# Patient Record
Sex: FEMALE | Race: BLACK OR AFRICAN AMERICAN | NOT HISPANIC OR LATINO | Employment: OTHER | URBAN - METROPOLITAN AREA
[De-identification: names, ages, dates, MRNs, and addresses within clinical notes are randomized per-mention and may not be internally consistent; named-entity substitution may affect disease eponyms.]

---

## 2019-03-12 ENCOUNTER — OFFICE VISIT (OUTPATIENT)
Dept: CARDIOLOGY | Facility: CLINIC | Age: 79
End: 2019-03-12
Payer: MEDICARE

## 2019-03-12 VITALS
DIASTOLIC BLOOD PRESSURE: 88 MMHG | HEART RATE: 93 BPM | SYSTOLIC BLOOD PRESSURE: 150 MMHG | HEIGHT: 63 IN | WEIGHT: 216.38 LBS | BODY MASS INDEX: 38.34 KG/M2

## 2019-03-12 DIAGNOSIS — R07.9 ACUTE CHEST PAIN: ICD-10-CM

## 2019-03-12 DIAGNOSIS — E78.00 PURE HYPERCHOLESTEROLEMIA: ICD-10-CM

## 2019-03-12 DIAGNOSIS — I10 ESSENTIAL HYPERTENSION: Primary | ICD-10-CM

## 2019-03-12 DIAGNOSIS — R07.9 CHEST PAIN OF UNCERTAIN ETIOLOGY: ICD-10-CM

## 2019-03-12 DIAGNOSIS — J45.20 MILD INTERMITTENT ASTHMA WITHOUT COMPLICATION: ICD-10-CM

## 2019-03-12 DIAGNOSIS — R60.0 LOCALIZED EDEMA: ICD-10-CM

## 2019-03-12 DIAGNOSIS — K21.9 GASTROESOPHAGEAL REFLUX DISEASE, ESOPHAGITIS PRESENCE NOT SPECIFIED: ICD-10-CM

## 2019-03-12 PROCEDURE — 93000 EKG 12-LEAD: ICD-10-PCS | Mod: S$GLB,,, | Performed by: INTERNAL MEDICINE

## 2019-03-12 PROCEDURE — 1101F PT FALLS ASSESS-DOCD LE1/YR: CPT | Mod: CPTII,S$GLB,, | Performed by: INTERNAL MEDICINE

## 2019-03-12 PROCEDURE — 99999 PR PBB SHADOW E&M-NEW PATIENT-LVL III: CPT | Mod: PBBFAC,,, | Performed by: INTERNAL MEDICINE

## 2019-03-12 PROCEDURE — 1101F PR PT FALLS ASSESS DOC 0-1 FALLS W/OUT INJ PAST YR: ICD-10-PCS | Mod: CPTII,S$GLB,, | Performed by: INTERNAL MEDICINE

## 2019-03-12 PROCEDURE — 99999 PR PBB SHADOW E&M-NEW PATIENT-LVL III: ICD-10-PCS | Mod: PBBFAC,,, | Performed by: INTERNAL MEDICINE

## 2019-03-12 PROCEDURE — 3077F PR MOST RECENT SYSTOLIC BLOOD PRESSURE >= 140 MM HG: ICD-10-PCS | Mod: CPTII,S$GLB,, | Performed by: INTERNAL MEDICINE

## 2019-03-12 PROCEDURE — 3079F PR MOST RECENT DIASTOLIC BLOOD PRESSURE 80-89 MM HG: ICD-10-PCS | Mod: CPTII,S$GLB,, | Performed by: INTERNAL MEDICINE

## 2019-03-12 PROCEDURE — 93000 ELECTROCARDIOGRAM COMPLETE: CPT | Mod: S$GLB,,, | Performed by: INTERNAL MEDICINE

## 2019-03-12 PROCEDURE — 3079F DIAST BP 80-89 MM HG: CPT | Mod: CPTII,S$GLB,, | Performed by: INTERNAL MEDICINE

## 2019-03-12 PROCEDURE — 99205 PR OFFICE/OUTPT VISIT, NEW, LEVL V, 60-74 MIN: ICD-10-PCS | Mod: 25,S$GLB,, | Performed by: INTERNAL MEDICINE

## 2019-03-12 PROCEDURE — 3077F SYST BP >= 140 MM HG: CPT | Mod: CPTII,S$GLB,, | Performed by: INTERNAL MEDICINE

## 2019-03-12 PROCEDURE — 99205 OFFICE O/P NEW HI 60 MIN: CPT | Mod: 25,S$GLB,, | Performed by: INTERNAL MEDICINE

## 2019-03-12 RX ORDER — TIZANIDINE 4 MG/1
TABLET ORAL
Refills: 0 | COMMUNITY
Start: 2018-12-18

## 2019-03-12 RX ORDER — HYDRALAZINE HYDROCHLORIDE 10 MG/1
10 TABLET, FILM COATED ORAL 3 TIMES DAILY
Qty: 90 TABLET | Refills: 11 | Status: SHIPPED | OUTPATIENT
Start: 2019-03-12 | End: 2019-03-12 | Stop reason: SDUPTHER

## 2019-03-12 RX ORDER — AMLODIPINE BESYLATE 10 MG/1
TABLET ORAL
Qty: 90 TABLET | Refills: 3 | Status: SHIPPED | OUTPATIENT
Start: 2019-03-12 | End: 2019-03-12 | Stop reason: SDUPTHER

## 2019-03-12 RX ORDER — AMLODIPINE BESYLATE 10 MG/1
TABLET ORAL
COMMUNITY
End: 2019-03-12 | Stop reason: SDUPTHER

## 2019-03-12 RX ORDER — DULAGLUTIDE 1.5 MG/.5ML
INJECTION, SOLUTION SUBCUTANEOUS
COMMUNITY
Start: 2019-02-21

## 2019-03-12 RX ORDER — OMEPRAZOLE 20 MG/1
CAPSULE, DELAYED RELEASE ORAL
Refills: 0 | COMMUNITY
Start: 2019-02-25

## 2019-03-12 RX ORDER — POTASSIUM CHLORIDE 750 MG/1
TABLET, EXTENDED RELEASE ORAL
Refills: 0 | COMMUNITY
Start: 2019-02-19 | End: 2019-03-12

## 2019-03-12 RX ORDER — CHOLECALCIFEROL (VITAMIN D3) 125 MCG
CAPSULE ORAL
COMMUNITY
Start: 2018-09-05

## 2019-03-12 RX ORDER — LISINOPRIL 40 MG/1
TABLET ORAL
Refills: 0 | COMMUNITY
Start: 2018-12-07

## 2019-03-12 RX ORDER — ATORVASTATIN CALCIUM 40 MG/1
TABLET, FILM COATED ORAL
Refills: 0 | COMMUNITY
Start: 2019-02-26

## 2019-03-12 RX ORDER — HYDRALAZINE HYDROCHLORIDE 10 MG/1
10 TABLET, FILM COATED ORAL 3 TIMES DAILY
Qty: 270 TABLET | Refills: 3 | Status: SHIPPED | OUTPATIENT
Start: 2019-03-12 | End: 2020-03-11

## 2019-03-12 RX ORDER — AMLODIPINE BESYLATE 10 MG/1
TABLET ORAL
Qty: 90 TABLET | Refills: 3 | Status: SHIPPED | OUTPATIENT
Start: 2019-03-12

## 2019-03-12 RX ORDER — ONDANSETRON 4 MG/1
4 TABLET, FILM COATED ORAL EVERY 8 HOURS PRN
COMMUNITY

## 2019-03-12 RX ORDER — DIAZEPAM 5 MG/1
TABLET ORAL
Refills: 0 | COMMUNITY
Start: 2019-01-22

## 2019-03-12 RX ORDER — ALBUTEROL SULFATE 90 UG/1
AEROSOL, METERED RESPIRATORY (INHALATION)
COMMUNITY
Start: 2019-01-17

## 2019-03-12 NOTE — LETTER
March 12, 2019      Kassi Solis MD (Cindy)  [address] [facility] [address]           [facility]  [address] [place] [address], [address]  [facility] [address]  Phone: [phone]          Patient: Colleen Cabezas   MR Number: [id number]   YOB: 1940   Date of Visit: 3/12/2019       Dear Dr. Kassi Solis (Cindy):    Thank you for referring Colleen Cabezas to me for evaluation. Attached you will find relevant portions of my assessment and plan of care.    If you have questions, please do not hesitate to call me. I look forward to following Colleen Cabezas along with you.    Sincerely,    Ponce Sanders MD    Enclosure  CC:  No Recipients    If you would like to receive this communication electronically, please contact [email or web address] or [phone] to request more information on [employer] Link access.    For providers and/or their staff who would like to refer a patient to [facility], please contact us through our one-stop-shop provider referral line, [facility], at [phone].    If you feel you have received this communication in error or would no longer like to receive these types of communications, please e-mail [email or web address]

## 2019-03-12 NOTE — PROGRESS NOTES
"  Subjective:      Patient ID: Colleen Cabezas is a 78 y.o. female.    Chief Complaint: Chest Pain    HPI:  Pt used to see me years ago for hypertension.    Pt recently went to [facility] with chest pain.  Pt went to ER.  Pt had ECG's and labs and CXR In the ER.  Pt was felt to have indigestion and prescribed omeprazole.  There is no prior hx of chest pains before last week.  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  Pt has chronic shortness of breath with exertion.  Pt can walk about 2 blocks  There is no hx of chest pain with exertion.    HCTZ was recently discontinued possibly due to dehydration.    "I feel bloated when I don't take the HCTZ"    Dr Michelle did ultrasound of abdomen and bone density    Pt also saw Dr Woody    Review of Systems   Cardiovascular: Positive for chest pain and dyspnea on exertion. Negative for claudication, irregular heartbeat, leg swelling, near-syncope, orthopnea, palpitations and syncope.      Blood pressure at home 137/79 after taking AM meds.  Prior to meds /80's  Past Medical History:   Diagnosis Date    Asthma     Diabetes mellitus     Hyperlipidemia     Hypertension         Past Surgical History:   Procedure Laterality Date    EXPLORATORY LAPAROTOMY         Family History   Problem Relation Age of Onset    Heart failure Mother        Social History     Socioeconomic History    Marital status:      Spouse name: None    Number of children: None    Years of education: None    Highest education level: None   Social Needs    Financial resource strain: None    Food insecurity - worry: None    Food insecurity - inability: None    Transportation needs - medical: None    Transportation needs - non-medical: None   Occupational History    None   Tobacco Use    Smoking status: Never Smoker    Smokeless tobacco: Never Used   Substance and Sexual Activity " "   Alcohol use: No     Frequency: Never    Drug use: None    Sexual activity: None   Other Topics Concern    None   Social History Narrative    None       Current Outpatient Medications on File Prior to Visit   Medication Sig Dispense Refill    amLODIPine (NORVASC) 10 MG tablet amlodipine 10 mg tablet      atorvastatin (LIPITOR) 40 MG tablet TK 1 T PO QD  0    cholecalciferol, vitamin D3, (DIALYVITE VITAMIN D) 5,000 unit capsule vitamin d 5000 iu      diazePAM (VALIUM) 5 MG tablet TK 1 T PO QD PRN FOR ANXIETY  0    lisinopril (PRINIVIL,ZESTRIL) 40 MG tablet TK 1 T PO D  0    omeprazole (PRILOSEC) 20 MG capsule TK 1 T PO QD  0    ondansetron (ZOFRAN) 4 MG tablet Take 4 mg by mouth every 8 (eight) hours as needed for Nausea.      tiZANidine (ZANAFLEX) 4 MG tablet TK 1 T PO TID PRF MUSCLE SPASM  0    TRULICITY 1.5 mg/0.5 mL PnIj       VENTOLIN HFA 90 mcg/actuation inhaler       vitamin E 100 UNIT capsule Take by mouth.      [DISCONTINUED] potassium chloride (KLOR-CON) 10 MEQ TbSR TAKE 2 TABLETS BY MOUTH ON TUESDAY AND THURSDAY AND 1 TABLET BY MOUTH EVERY OTHER DAY  0    chlorpheniramine-pseudoephedrine-acetaminophen (SINUTAB) 2- mg per tablet Take 81 mg by mouth.       No current facility-administered medications on file prior to visit.        Review of patient's allergies indicates:   Allergen Reactions    Ibuprofen     Nitroglycerin      Objective:     Vitals:    03/12/19 1133 03/12/19 1201   BP: (!) 159/92 (!) 150/88   BP Location: Left arm Left arm   Patient Position: Sitting Sitting   BP Method: Large (Automatic)    Pulse: 93    Weight: 98.1 kg (216 lb 6.1 oz)    Height: 5' 3" (1.6 m)         Physical Exam   Constitutional: She is oriented to person, place, and time. She appears well-developed and well-nourished.   Eyes: No scleral icterus.   Neck: No JVD present. Carotid bruit is not present.   Cardiovascular: Normal rate and regular rhythm. Exam reveals no gallop.   No murmur " heard.  Pulmonary/Chest: Breath sounds normal.   Musculoskeletal: She exhibits edema (trace pitting edema bilaterally).   Neurological: She is alert and oriented to person, place, and time.   Skin: Skin is warm and dry.   Psychiatric: She has a normal mood and affect. Her behavior is normal. Judgment and thought content normal.   Vitals reviewed.     ECG--NSR, left axis deviation, clockwise rotation, nonspecific T wave abnormality  Assessment:     1. Essential hypertension    2. Pure hypercholesterolemia    3. Chest pain of uncertain etiology    4. Gastroesophageal reflux disease, esophagitis presence not specified    5. Mild intermittent asthma without complication    6. Acute chest pain    7. Localized edema      Plan:   Colleen was seen today for chest pain.    Diagnoses and all orders for this visit:    Essential hypertension    Pure hypercholesterolemia    Chest pain of uncertain etiology  -     IN OFFICE EKG 12-LEAD (to Muse)    Gastroesophageal reflux disease, esophagitis presence not specified    Mild intermittent asthma without complication    Acute chest pain  -     IN OFFICE EKG 12-LEAD (to Muse)  -     NM Myocardial Perfusion Spect Multi Pharmacologic; Future    Localized edema    Other orders  -     hydrALAZINE (APRESOLINE) 10 MG tablet; Take 1 tablet (10 mg total) by mouth 3 (three) times daily.     Discontinue potassium since pt is no longer on a diuretic    Will repeat Lexiscan Cardiolite stress test at [facility]    Need labs done at [facility] and Dr Michelle's office.  Need CXR report from [facility]     Note the edema is likely due to amlodipine.    Need to review records from Dr Michelle why HCTZ was discontinued.  Pt reports feeling better when she was taking it.    Consider hydralazine as an alternative to amlodipine which would not cause edema    Consider resumption of HCTZ or possible spironolactone for hypertension and edema.      F/u with Dr Michelle 4/23/19    Follow-up in about 4 weeks (around 4/9/2019).

## 2019-04-12 ENCOUNTER — TELEPHONE (OUTPATIENT)
Dept: CARDIOLOGY | Facility: CLINIC | Age: 79
End: 2019-04-12

## 2019-04-12 NOTE — TELEPHONE ENCOUNTER
"Reached out to patient to reschedule "No Show" appointment from 04/09/19.  Left message on patient's voice mail to call for appointment.  "

## 2023-11-21 DIAGNOSIS — R79.89 ELEVATED BRAIN NATRIURETIC PEPTIDE (BNP) LEVEL: Primary | ICD-10-CM
